# Patient Record
Sex: MALE | Race: WHITE | NOT HISPANIC OR LATINO | Employment: OTHER | URBAN - METROPOLITAN AREA
[De-identification: names, ages, dates, MRNs, and addresses within clinical notes are randomized per-mention and may not be internally consistent; named-entity substitution may affect disease eponyms.]

---

## 2024-03-22 ENCOUNTER — TELEPHONE (OUTPATIENT)
Age: 69
End: 2024-03-22

## 2024-03-27 ENCOUNTER — OFFICE VISIT (OUTPATIENT)
Dept: GASTROENTEROLOGY | Facility: CLINIC | Age: 69
End: 2024-03-27
Payer: COMMERCIAL

## 2024-03-27 VITALS
BODY MASS INDEX: 26.88 KG/M2 | DIASTOLIC BLOOD PRESSURE: 79 MMHG | HEART RATE: 63 BPM | OXYGEN SATURATION: 99 % | HEIGHT: 71 IN | WEIGHT: 192 LBS | TEMPERATURE: 96.7 F | SYSTOLIC BLOOD PRESSURE: 140 MMHG

## 2024-03-27 DIAGNOSIS — R21 PERIANAL RASH: ICD-10-CM

## 2024-03-27 DIAGNOSIS — R19.8 ANAL DISCHARGE: ICD-10-CM

## 2024-03-27 DIAGNOSIS — R19.4 CHANGE IN BOWEL HABITS: Primary | ICD-10-CM

## 2024-03-27 DIAGNOSIS — K62.5 BRBPR (BRIGHT RED BLOOD PER RECTUM): ICD-10-CM

## 2024-03-27 PROCEDURE — 99203 OFFICE O/P NEW LOW 30 MIN: CPT | Performed by: PHYSICIAN ASSISTANT

## 2024-03-27 RX ORDER — LEVOCETIRIZINE DIHYDROCHLORIDE 5 MG/1
5 TABLET, FILM COATED ORAL EVERY EVENING
COMMUNITY

## 2024-03-27 RX ORDER — HYDROCORTISONE 25 MG/G
CREAM TOPICAL 2 TIMES DAILY
Qty: 28 G | Refills: 5 | Status: SHIPPED | OUTPATIENT
Start: 2024-03-27

## 2024-03-27 RX ORDER — METOPROLOL SUCCINATE 50 MG/1
50 TABLET, EXTENDED RELEASE ORAL DAILY
COMMUNITY

## 2024-03-27 RX ORDER — LISINOPRIL 2.5 MG/1
2.5 TABLET ORAL DAILY
COMMUNITY

## 2024-03-27 RX ORDER — GLIPIZIDE 5 MG/1
5 TABLET, FILM COATED, EXTENDED RELEASE ORAL DAILY
COMMUNITY

## 2024-03-27 RX ORDER — ATORVASTATIN CALCIUM 10 MG/1
100 TABLET, FILM COATED ORAL DAILY
COMMUNITY

## 2024-03-27 RX ORDER — ASPIRIN 81 MG/1
81 TABLET, CHEWABLE ORAL DAILY
COMMUNITY

## 2024-03-27 RX ORDER — EZETIMIBE 10 MG/1
10 TABLET ORAL DAILY
COMMUNITY

## 2024-03-27 NOTE — PATIENT INSTRUCTIONS
Please start on a daily fiber powder something like Benefiber or Metamucil.  This typically tends to help bulk up and also keep stool nice and regular.  At times when patients have issues with drainage or incontinence, a fiber supplement tends to help with this complaint.  You do have some evidence of hemorrhoidal tissue externally as well as skin irritation in the surrounding areas.  I am giving you a steroid cream that we typically use to treat external hemorrhoids.  I would apply a small amount to this area for no more than 1 week at a time and see how your symptoms are.  The reasoning for limiting the use of the steroid is because it thins the skin.    Because of this change and new symptom, a colonoscopy is also recommended to completely evaluate for internal issues.  This will be completed at [facility].

## 2024-03-27 NOTE — PROGRESS NOTES
[facility] - Outpatient Consultation  Guy Burnham 68 y.o. male MRN: [id number]  Encounter: [id number]    ASSESSMENT AND PLAN:      1. Change in bowel habits  2. Anal discharge  3. BRBPR (bright red blood per rectum)  4. Perianal rash    Pt with several weeks - 1.5 months of change of bowel habit/anorectal discharge. Examination reveals hemorrhoids and a justyn-anal rash. Etiol of symptoms not clear at this time, may be secondary to hemorrhoids, anal fissure, IBD, proctitis, IBS, infectious pathology, medication SE, obstruction lesion etc. We did review today the rash may be secondary to irritative change, dermatitis, fungal infection, other etiol. We did review potential SE of Jardiance is genitourinary fungal infection.     At this time, recommend trial fiber supplement daily to help add bulk/form to stool.   Recommend colonoscopy to evaluate for intra-luminal sources of symptoms. Recommend bx for microscopic colitis in the setting of macroscopically normal colon.   Trial topical anusol for 7-10 days to external hemorrhoids, may help clear up rash. Reviewed limited use of medication as this will thin the skin.  However, ultimately, for evaluation of the rash I recommend Dermatology evaluation.     Risks associated with endoscopic evaluation discussed with patient today, including but not limited to bleeding, infection, perforation, and organ injury, all of which are low. Pt demonstrates understanding and is agreeable to the plan. Golytely bowel prep sent to pharmacy today.     - Colonoscopy; Future  - polyethylene glycol (GOLYTELY) 4000 mL solution; Take 4,000 mL by mouth once for 1 dose  Dispense: 4000 mL; Refill: 0  - hydrocortisone (ANUSOL-HC) 2.5 % rectal cream; Apply topically 2 (two) times a day  Dispense: 28 g; Refill: 5    We will follow up after colonoscopy evaluation.   ______________________________________________________________________    HPI: Patient is a 68 y.o. male  who presents today for a consultation regarding colonoscopy. Pmhx sig for DM2, CAD s/p CABG, HTN.     Pt is new to clinic. Here today with spouse. He notes that for the past 1-1.5 months, has been dealing with anorectal leakage, scant amt daily, notes wet in underwear, feels discomfort at anus. No obvious precipitants for onset of symptoms. Notes that typically moving his bowels once daily. More recently has been somewhat looser, though no diarrhea. Notes scant amt of BRBPR on the wipe only, no large volume hematochezia or melena. No straining or constipation. Concerned these symptoms may be secondary to Jardiance usage (has been on for several years) and d/c medication. Went to PCP, had fungal cream offered, though pt declined. Tried OTC prep H, bc felt like he was developing hemorrhoids also, didn't seem to make much of a difference. No sig abd pain or rectal pain related to defecation. No nocturnal BM. No unintentional weight loss over past 6 months. Pt denies any family hx of CRC. No hx of colon polyps.     Pertaining to UGI tract, pt notes infrequent heartburn depending on oral intake.   Denies nausea, emesis, dysphagia or odynphagia.   No early satiety.     NSAIDs: Rare  Tobacco: None  EtOH: Socially    05/2023: BUN 16, Cr 0.9, AST 23, ALT 25, ALP 66, albumin 4.2, t bili 1.2     Endoscopic history:   EGD: unknown   Colon: 2017    Review of Systems   Constitutional:  Negative for appetite change, chills, fever and unexpected weight change.   HENT:  Negative for mouth sores and trouble swallowing.    Respiratory:  Negative for shortness of breath.    Cardiovascular:  Negative for palpitations.   Gastrointestinal:  Positive for anal bleeding and diarrhea. Negative for abdominal pain, blood in stool, nausea and vomiting.   Endocrine: Negative.    Skin:  Positive for rash. Negative for color change.   Allergic/Immunologic: Negative.    Neurological:  Negative for seizures and syncope.   Hematological: Negative.   "  All other systems reviewed and are negative.  Otherwise Per HPI    Historical Information   Past Medical History:   Diagnosis Date    Allergic rhinitis     Diabetes mellitus (HCC)      Past Surgical History:   Procedure Laterality Date    PROSTATE SURGERY      PROSTATE SURGERY      had cancer     Social History   Social History     Substance and Sexual Activity   Alcohol Use Yes    Alcohol/week: 5.0 standard drinks of alcohol    Types: 5 Cans of beer per week    Comment: social  drinker     Social History     Substance and Sexual Activity   Drug Use Never     Social History     Tobacco Use   Smoking Status Never   Smokeless Tobacco Never     No family history on file.    Meds/Allergies       Current Outpatient Medications:     aspirin 81 mg chewable tablet    atorvastatin (LIPITOR) 10 mg tablet    ezetimibe (ZETIA) 10 mg tablet    glipiZIDE (GLUCOTROL XL) 5 mg 24 hr tablet    levocetirizine (XYZAL) 5 MG tablet    lisinopril (ZESTRIL) 2.5 mg tablet    metFORMIN (GLUCOPHAGE) 1000 MG tablet    metoprolol succinate (TOPROL-XL) 50 mg 24 hr tablet    sitaGLIPtin (JANUVIA) 100 mg tablet    Allergies   Allergen Reactions    Ozempic (0.25 Or 0.5 Mg-Dose) [Semaglutide(0.25 Or 0.5mg-Dos)] Vomiting     Colds  sweats    Trulicity [Dulaglutide] Vomiting     Colds sweats    Pollen Extract Nasal Congestion     Needs mask when cutting grass       Objective     Blood pressure 140/79, pulse 63, temperature (!) 96.7 °F (35.9 °C), temperature source Tympanic, height 5' 11\" (1.803 m), weight 87.1 kg (192 lb), SpO2 99%. Body mass index is 26.78 kg/m².    Physical Exam  Vitals and nursing note reviewed. Exam conducted with a chaperone present.   Constitutional:       General: He is not in acute distress.     Appearance: He is well-developed.   HENT:      Head: Normocephalic and atraumatic.   Eyes:      Conjunctiva/sclera: Conjunctivae normal.   Cardiovascular:      Rate and Rhythm: Normal rate and regular rhythm.      Heart sounds: No " murmur heard.  Pulmonary:      Effort: Pulmonary effort is normal. No respiratory distress.      Breath sounds: Normal breath sounds.   Abdominal:      General: Bowel sounds are normal. There is no distension.      Palpations: Abdomen is soft.      Tenderness: There is no abdominal tenderness. There is no guarding or rebound.   Genitourinary:     Comments: External hemorrhoids noted; there is perianal erythema, excoriation, thickening of tissue/kissing distribution; no obvious mass, no vesicular lesions, no ulcerations noted; there is no abnormality of the perineum noted   Skin:     General: Skin is warm and dry.      Coloration: Skin is not jaundiced.   Neurological:      General: No focal deficit present.      Mental Status: He is alert and oriented to person, place, and time.   Psychiatric:         Mood and Affect: Mood normal.         Behavior: Behavior normal.        Lab Results:   No visits with results within 1 Day(s) from this visit.   Latest known visit with results is:   No results found for any previous visit.     Radiology Results:   No results found.    Lina Goldsmith PA-C    **Please note:  Dictation voice to text software may have been used in the creation of this record.  Occasional wrong word or “sound alike” substitutions may have occurred due to the inherent limitations of voice recognition software.  Read the chart carefully and recognize, using context, where substitutions have occurred.**

## 2024-05-22 ENCOUNTER — HOSPITAL ENCOUNTER (OUTPATIENT)
Dept: PERIOP | Facility: HOSPITAL | Age: 69
Setting detail: OUTPATIENT SURGERY
Discharge: HOME | End: 2024-05-22
Admitting: STUDENT IN AN ORGANIZED HEALTH CARE EDUCATION/TRAINING PROGRAM
Payer: COMMERCIAL

## 2024-05-22 ENCOUNTER — ANESTHESIA EVENT (OUTPATIENT)
Dept: PERIOP | Facility: HOSPITAL | Age: 69
End: 2024-05-22

## 2024-05-22 ENCOUNTER — ANESTHESIA (OUTPATIENT)
Dept: PERIOP | Facility: HOSPITAL | Age: 69
End: 2024-05-22

## 2024-05-22 VITALS
WEIGHT: 180 LBS | TEMPERATURE: 97 F | SYSTOLIC BLOOD PRESSURE: 138 MMHG | HEART RATE: 68 BPM | RESPIRATION RATE: 18 BRPM | OXYGEN SATURATION: 99 % | DIASTOLIC BLOOD PRESSURE: 79 MMHG | BODY MASS INDEX: 25.2 KG/M2 | HEIGHT: 71 IN

## 2024-05-22 DIAGNOSIS — R19.4 CHANGE IN BOWEL HABITS: ICD-10-CM

## 2024-05-22 DIAGNOSIS — R19.8 ANAL DISCHARGE: ICD-10-CM

## 2024-05-22 LAB — GLUCOSE SERPL-MCNC: 131 MG/DL (ref 65–140)

## 2024-05-22 PROCEDURE — 88305 TISSUE EXAM BY PATHOLOGIST: CPT | Performed by: PATHOLOGY

## 2024-05-22 PROCEDURE — 82948 REAGENT STRIP/BLOOD GLUCOSE: CPT

## 2024-05-22 PROCEDURE — 45380 COLONOSCOPY AND BIOPSY: CPT | Performed by: STUDENT IN AN ORGANIZED HEALTH CARE EDUCATION/TRAINING PROGRAM

## 2024-05-22 RX ORDER — ONDANSETRON 2 MG/ML
4 INJECTION INTRAMUSCULAR; INTRAVENOUS ONCE AS NEEDED
Status: DISCONTINUED | OUTPATIENT
Start: 2024-05-22 | End: 2024-05-26 | Stop reason: HOSPADM

## 2024-05-22 RX ORDER — PROPOFOL 10 MG/ML
INJECTION, EMULSION INTRAVENOUS AS NEEDED
Status: DISCONTINUED | OUTPATIENT
Start: 2024-05-22 | End: 2024-05-22

## 2024-05-22 RX ORDER — LIDOCAINE HYDROCHLORIDE 10 MG/ML
INJECTION, SOLUTION EPIDURAL; INFILTRATION; INTRACAUDAL; PERINEURAL AS NEEDED
Status: DISCONTINUED | OUTPATIENT
Start: 2024-05-22 | End: 2024-05-22

## 2024-05-22 RX ORDER — SODIUM CHLORIDE, SODIUM LACTATE, POTASSIUM CHLORIDE, CALCIUM CHLORIDE 600; 310; 30; 20 MG/100ML; MG/100ML; MG/100ML; MG/100ML
INJECTION, SOLUTION INTRAVENOUS CONTINUOUS PRN
Status: DISCONTINUED | OUTPATIENT
Start: 2024-05-22 | End: 2024-05-22

## 2024-05-22 RX ADMIN — LIDOCAINE HYDROCHLORIDE 50 MG: 10 INJECTION, SOLUTION EPIDURAL; INFILTRATION; INTRACAUDAL; PERINEURAL at 08:59

## 2024-05-22 RX ADMIN — SODIUM CHLORIDE, SODIUM LACTATE, POTASSIUM CHLORIDE, AND CALCIUM CHLORIDE: .6; .31; .03; .02 INJECTION, SOLUTION INTRAVENOUS at 08:45

## 2024-05-22 RX ADMIN — PROPOFOL 70 MG: 10 INJECTION, EMULSION INTRAVENOUS at 08:59

## 2024-05-22 RX ADMIN — PROPOFOL 150 MCG/KG/MIN: 10 INJECTION, EMULSION INTRAVENOUS at 09:00

## 2024-05-22 RX ADMIN — PROPOFOL 30 MG: 10 INJECTION, EMULSION INTRAVENOUS at 09:15

## 2024-05-22 RX ADMIN — PROPOFOL 30 MG: 10 INJECTION, EMULSION INTRAVENOUS at 09:11

## 2024-05-22 NOTE — H&P
[facility]  History & Physical     PATIENT INFO     Name: Guy Burnham  YOB: 1955   Age: 68 y.o.   Sex: male   MRN: [id number]     HISTORY OF PRESENT ILLNESS     Guy Burnham is a 68 y.o. year old male who presents for colonoscopy for change in bowel habits, bright red blood per rectum.  Last colonoscopy in 2017.  No antithrombotics or anticoagulants.     REVIEW OF SYSTEMS     Per the HPI, and otherwise unremarkable.    Historical Information   Past Medical History:   Diagnosis Date    Allergic rhinitis     Diabetes mellitus (HCC)      Past Surgical History:   Procedure Laterality Date    PROSTATE SURGERY      PROSTATE SURGERY      had cancer     Social History   Social History     Substance and Sexual Activity   Alcohol Use Yes    Alcohol/week: 5.0 standard drinks of alcohol    Types: 5 Cans of beer per week    Comment: social  drinker     Social History     Substance and Sexual Activity   Drug Use Never     Social History     Tobacco Use   Smoking Status Never   Smokeless Tobacco Never     History reviewed. No pertinent family history.     MEDICATIONS & ALLERGIES     Current Outpatient Medications   Medication Instructions    aspirin 81 mg, Oral, Daily    atorvastatin (LIPITOR) 100 mg, Oral, Daily    ezetimibe (ZETIA) 10 mg, Oral, Daily    glipiZIDE (GLUCOTROL XL) 5 mg, Oral, Daily    hydrocortisone (ANUSOL-HC) 2.5 % rectal cream Topical, 2 times daily    Jardiance 10 mg, Oral, Daily    levocetirizine (XYZAL) 5 mg, Oral, Every evening    lisinopril (ZESTRIL) 2.5 mg, Oral, Daily    metFORMIN (GLUCOPHAGE) 1,000 mg, Oral, 2 times daily with meals    metoprolol succinate (TOPROL-XL) 50 mg, Oral, Daily    sitaGLIPtin (JANUVIA) 100 mg tablet Oral, Daily     Allergies   Allergen Reactions    Ozempic (0.25 Or 0.5 Mg-Dose) [Semaglutide(0.25 Or 0.5mg-Dos)] Vomiting     Colds  sweats    Trulicity [Dulaglutide] Vomiting     Colds sweats    Pollen Extract Nasal Congestion      "Needs mask when cutting grass        PHYSICAL EXAM      Objective   Blood pressure 135/76, pulse 62, temperature (!) 97.2 °F (36.2 °C), temperature source Tympanic, resp. rate 18, height 5' 11\" (1.803 m), weight 81.6 kg (180 lb), SpO2 98%. Body mass index is 25.1 kg/m².    General Appearance:   Alert, cooperative, no distress   Lungs:   Equal chest rise, respirations unlabored    Heart:   Regular rate and rhythm   Abdomen:   Soft, non-tender, non-distended; normal bowel sounds; no masses, no organomegaly    Extremities:   No edema       ASSESSMENT & PLAN     This is a 68 y.o. year old male here for colonoscopy, and he is stable and optimized for his procedure.      Jak Lozano D.O.  [facility]  Division of Gastroenterology & Hepatology  Available on [email or web address]  [email or web address]    ** Please Note: This note is constructed using a voice recognition dictation system. **  "

## 2024-05-22 NOTE — ANESTHESIA POSTPROCEDURE EVALUATION
Post-Op Assessment Note    CV Status:  Stable    Pain management: adequate       Mental Status:  Alert and sleepy   Hydration Status:  Euvolemic   PONV Controlled:  Controlled   Airway Patency:  Patent     Post Op Vitals Reviewed: Yes    No anethesia notable event occurred.    Staff: CRNA               BP   127/73   Temp   97.2   Pulse  67   Resp   18   SpO2   98%

## 2024-05-22 NOTE — ANESTHESIA PREPROCEDURE EVALUATION
Procedure:  COLONOSCOPY    Relevant Problems   No relevant active problems        Physical Exam    Airway    Mallampati score: II  TM Distance: >3 FB  Neck ROM: full     Dental       Cardiovascular      Pulmonary      Other Findings        Anesthesia Plan  ASA Score- 2     Anesthesia Type- IV sedation with anesthesia with ASA Monitors.         Additional Monitors:     Airway Plan:            Plan Factors-    Chart reviewed.                      Induction- intravenous.    Postoperative Plan-         Informed Consent- Anesthetic plan and risks discussed with patient.  I personally reviewed this patient with the CRNA. Discussed and agreed on the Anesthesia Plan with the CRNA..

## 2024-05-24 PROCEDURE — 88305 TISSUE EXAM BY PATHOLOGIST: CPT | Performed by: PATHOLOGY

## 2024-06-04 ENCOUNTER — TELEPHONE (OUTPATIENT)
Dept: HEMATOLOGY ONCOLOGY | Facility: CLINIC | Age: 69
End: 2024-06-04

## 2024-06-04 NOTE — TELEPHONE ENCOUNTER
----- Message from Jak Lozano DO sent at 6/4/2024  9:21 AM EDT -----  Hi,    Please contact patient and inform him that the colon polyp that we removed was a benign but precancerous type polyp called a sessile serrated polyp.  The random biopsies did not show any signs of inflammation.  This is good news.    I recommend next colonoscopy in 7 years.    Thanks.    Jak Lozano D.O.  [facility]  Division of Gastroenterology & Hepatology  Available on [email or web address]  [email or web address]

## 2025-04-16 ENCOUNTER — TELEPHONE (OUTPATIENT)
Age: 70
End: 2025-04-16

## 2025-04-16 NOTE — TELEPHONE ENCOUNTER
New Patient      Insurance   Current Insurance? CBC   Insurance E-verified? Yes    History   Reason for appointment/active symptoms? Elevated PSA of 2.1 after prostatectomy about 10 years ago due to prostate cancer       Has the patient had any previous Urologist(s)? Yes, Rianna     Was the patient seen in the ED? No     Labs/Imaging(Including Out Of Network)? Yes     Records Requested? No, records on file   Records Visible in EPIC? Yes      Personal history of cancer? Yes, prostate and skin both resolved     Appointment   Office location preference:  [facility]  ?   Appointment Details   Date: 5/15/25  Time: 830 am  Location: [facility]  Provider: Dr. Izquierdo  Does the appointment need further review? No

## 2025-05-14 PROBLEM — C61 PROSTATE CANCER (HCC): Status: ACTIVE | Noted: 2025-05-14

## 2025-05-15 ENCOUNTER — OFFICE VISIT (OUTPATIENT)
Dept: UROLOGY | Facility: CLINIC | Age: 70
End: 2025-05-15
Payer: COMMERCIAL

## 2025-05-15 ENCOUNTER — TELEPHONE (OUTPATIENT)
Dept: UROLOGY | Facility: CLINIC | Age: 70
End: 2025-05-15

## 2025-05-15 VITALS
OXYGEN SATURATION: 94 % | SYSTOLIC BLOOD PRESSURE: 140 MMHG | HEART RATE: 68 BPM | HEIGHT: 72 IN | DIASTOLIC BLOOD PRESSURE: 82 MMHG | WEIGHT: 193 LBS | TEMPERATURE: 97.6 F | BODY MASS INDEX: 26.14 KG/M2

## 2025-05-15 DIAGNOSIS — C61 PROSTATE CANCER (HCC): Primary | ICD-10-CM

## 2025-05-15 DIAGNOSIS — E11.40 TYPE 2 DIABETES MELLITUS WITH DIABETIC NEUROPATHY, WITH LONG-TERM CURRENT USE OF INSULIN (HCC): ICD-10-CM

## 2025-05-15 DIAGNOSIS — Z79.4 TYPE 2 DIABETES MELLITUS WITH DIABETIC NEUROPATHY, WITH LONG-TERM CURRENT USE OF INSULIN (HCC): ICD-10-CM

## 2025-05-15 LAB
SL AMB  POCT GLUCOSE, UA: NORMAL
SL AMB LEUKOCYTE ESTERASE,UA: NORMAL
SL AMB POCT BILIRUBIN,UA: NORMAL
SL AMB POCT BLOOD,UA: NORMAL
SL AMB POCT CLARITY,UA: CLEAR
SL AMB POCT COLOR,UA: YELLOW
SL AMB POCT KETONES,UA: NORMAL
SL AMB POCT NITRITE,UA: NORMAL
SL AMB POCT PH,UA: 6
SL AMB POCT SPECIFIC GRAVITY,UA: 1.02
SL AMB POCT URINE PROTEIN: NORMAL
SL AMB POCT UROBILINOGEN: 0.2

## 2025-05-15 PROCEDURE — 99203 OFFICE O/P NEW LOW 30 MIN: CPT | Performed by: UROLOGY

## 2025-05-15 PROCEDURE — 81003 URINALYSIS AUTO W/O SCOPE: CPT | Performed by: UROLOGY

## 2025-05-15 RX ORDER — ACYCLOVIR 400 MG/1
TABLET ORAL
COMMUNITY
Start: 2025-04-25

## 2025-05-15 RX ORDER — GLIPIZIDE 5 MG/1
TABLET ORAL
COMMUNITY
Start: 2025-04-28

## 2025-05-15 RX ORDER — ATORVASTATIN CALCIUM 80 MG/1
80 TABLET, FILM COATED ORAL EVERY MORNING
COMMUNITY
Start: 2025-04-19

## 2025-05-15 NOTE — PROGRESS NOTES
UROLOGY PROGRESS NOTE         NAME: Guy Burnham  AGE: 69 y.o. SEX: male  : 1955   MRN: [id number]    DATE: 5/15/2025  TIME: 9:57 AM    Assessment and Plan      Impression:   1. Prostate cancer (HCC)  -     POCT urine dip auto non-scope  -     NM PET CT skull base to mid thigh; Future; Expected date: 05/15/2025  2. Type 2 diabetes mellitus with diabetic neuropathy, with long-term current use of insulin (HCC)       Plan: Patient has prostate cancer recurrence likely with PSA of 2.1 recently.  Looking back over old PSAs his PSA doubling time is slightly greater than 1 year.  I recommended a PMSA PET scan.  I explained to him this is most likely a local recurrence however it could be metastatic disease.  Treatment will depend on the findings.  He had his robotic prostatectomy with Dr. Gerry Quiles at [facility] about 10 years ago.  I do not have any pathology available.  He was told his prostate cancer was completely removed and treated at that time.      Chief Complaint     Chief Complaint   Patient presents with   • New Patient Visit     PSA 25 2.1  Prostate removed 10 years ago at [facility]     History of Present Illness     HPI: Guy Burnham is a 69 y.o. year old male who presents with history of prostate cancer status post prostatectomy 10 years ago with recent PSA of 2.1 indicating recurrence.  Here for initial visit.  PSAs include 0.36 , 0.73 , 0.82 , and most recently 2.1 .  Urinary control is good.  Apparently patient had nerve sparing procedure.  Patient's dad also had prostate cancer with prostate removed at age 50.  Patient's father is now 89.                The following portions of the patient's history were reviewed and updated as appropriate: allergies, current medications, past family history, past medical history, past social history, past surgical history and problem list.  Past Medical History:   Diagnosis Date   • Allergic rhinitis    • Diabetes  mellitus (HCC)      Past Surgical History:   Procedure Laterality Date   • PROSTATE SURGERY     • PROSTATE SURGERY      had cancer     shoulder  Review of Systems     Const: Denies chills, fever and weight loss.  CV: Denies chest pain.  Resp: Denies SOB.  GI: Denies abdominal pain, nausea and vomiting.  : Denies symptoms other than stated above.  Musculo: Denies back pain.    Objective   /82   Pulse 68   Temp 97.6 °F (36.4 °C)   Ht 6' (1.829 m)   Wt 87.5 kg (193 lb)   SpO2 94%   BMI 26.18 kg/m²     Physical Exam  Const: Appears healthy and well developed. No signs of acute distress present.  Resp: Respirations are regular and unlabored.   CV: Rate is regular. Rhythm is regular.  Abdomen: Abdomen is soft, nontender, and nondistended. Kidneys are not palpable.  : External genitalia normal.  Prostate surgically absent and no nodule or digital evidence of local recurrence.  Psych: Patient's attitude is cooperative. Mood is normal. Affect is normal.    Procedure   Procedures     Current Medications   Current Medications[1]        Hugo Izquierdo MD               [1]    Current Outpatient Medications:   •  aspirin 81 mg chewable tablet, Chew 81 mg in the morning., Disp: , Rfl:   •  atorvastatin (LIPITOR) 80 mg tablet, Take 80 mg by mouth every morning, Disp: , Rfl:   •  Continuous Glucose Sensor (Dexcom G7 Sensor), USE TO TEST BLOOD SUGARS 4+ TIMES DAILY, Disp: , Rfl:   •  ezetimibe (ZETIA) 10 mg tablet, Take 10 mg by mouth in the morning., Disp: , Rfl:   •  glipiZIDE (GLUCOTROL) 5 mg tablet, PLEASE SEE ATTACHED FOR DETAILED DIRECTIONS, Disp: , Rfl:   •  levocetirizine (XYZAL) 5 MG tablet, Take 5 mg by mouth every evening, Disp: , Rfl:   •  lisinopril (ZESTRIL) 2.5 mg tablet, Take 2.5 mg by mouth in the morning., Disp: , Rfl:   •  metFORMIN (GLUCOPHAGE) 1000 MG tablet, Take 1,000 mg by mouth in the morning and 1,000 mg in the evening. Take with meals., Disp: , Rfl:   •  metoprolol succinate (TOPROL-XL) 50  mg 24 hr tablet, Take 50 mg by mouth in the morning., Disp: , Rfl:   •  sitaGLIPtin (JANUVIA) 100 mg tablet, Take by mouth in the morning., Disp: , Rfl:   •  hydrocortisone (ANUSOL-HC) 2.5 % rectal cream, Apply topically 2 (two) times a day (Patient not taking: Reported on 5/15/2025), Disp: 28 g, Rfl: 5  •  Jardiance 10 MG TABS tablet, Take 10 mg by mouth daily (Patient not taking: Reported on 5/15/2025), Disp: , Rfl:

## 2025-05-23 ENCOUNTER — HOSPITAL ENCOUNTER (OUTPATIENT)
Dept: NUCLEAR MEDICINE | Facility: HOSPITAL | Age: 70
End: 2025-05-23
Attending: UROLOGY
Payer: COMMERCIAL

## 2025-05-23 DIAGNOSIS — C61 PROSTATE CANCER (HCC): ICD-10-CM

## 2025-05-23 PROCEDURE — 78815 PET IMAGE W/CT SKULL-THIGH: CPT

## 2025-05-23 PROCEDURE — A9595 HB PIFLUFOLASTAT F-18, DIAGNOSTIC, 1 MILLICURIE: HCPCS

## 2025-05-28 ENCOUNTER — TELEPHONE (OUTPATIENT)
Age: 70
End: 2025-05-28

## 2025-06-10 NOTE — PROGRESS NOTES
UROLOGY PROGRESS NOTE         NAME: Guy Bunrham  AGE: 69 y.o. SEX: male  : 1955   MRN: [id number]    DATE: 2025  TIME: 8:50 AM    Assessment and Plan      Impression:   1. Prostate cancer (HCC)  -     MRI pelvis w wo contrast; Future; Expected date: 2025  -     Ambulatory Referral to Radiation Oncology; Future       Plan: Patient appears to have a local recurrence likely near the right urethrovesical junction.  As noted he had a prostatectomy 10 years ago.  Recent PSA 2.1.  This is the first PSA has had in many years.  P MSA PET scan positive in that location.    Discussed with patient and wife.  Appears to be candidate for radiation plus a course of androgen deprivation.  Will leave duration of androgen deprivation course up to radiation oncology.  Also offered patient second opinion from medical oncology or urology standpoint.  In addition I recommended a pelvic MRI which was suggested by the radiologist reading a PMS a PET scan.  I also recommended a cystoscopy to rule out any other pathology including possible diverticulum in that location.  See the PET scan report.  Patient remains asymptomatic.  Follow-up for cystoscopy after pelvic MRI.      Chief Complaint     Chief Complaint   Patient presents with   • Follow-up     Pt reports today to review PET scan. PSA 2025 2.1     History of Present Illness     HPI: Guy Burnham is a 69 y.o. year old male who presents with history of prostate cancer treated with prostatectomy by Dr. Gerry Quiles 10 years ago with PSA recurrence 2.1 recently.  PMS a scan performed patient here to review.  This shows activity potentially related to a bladder diverticulum versus recurrence.  Prostate/pelvic MRI recommended.              The following portions of the patient's history were reviewed and updated as appropriate: allergies, current medications, past family history, past medical history, past social history, past surgical history and problem  list.  Past Medical History[1]  Past Surgical History[2]  shoulder  Review of Systems     Const: Denies chills, fever and weight loss.  CV: Denies chest pain.  Resp: Denies SOB.  GI: Denies abdominal pain, nausea and vomiting.  : Denies symptoms other than stated above.  Musculo: Denies back pain.    Objective   /78   Pulse 70   Temp 98 °F (36.7 °C)   Ht 6' (1.829 m)   Wt 88.4 kg (194 lb 12.8 oz)   SpO2 97%   BMI 26.42 kg/m²     Physical Exam  Const: Appears healthy and well developed. No signs of acute distress present.  Resp: Respirations are regular and unlabored.   CV: Rate is regular. Rhythm is regular.  Abdomen: Abdomen is soft, nontender, and nondistended. Kidneys are not palpable.  : Not performed  Psych: Patient's attitude is cooperative. Mood is normal. Affect is normal.    Procedure   Procedures     Current Medications   Current Medications[3]        Hugo Izquierdo MD               [1]  Past Medical History:  Diagnosis Date   • Allergic rhinitis    • Diabetes mellitus (HCC)    [2]  Past Surgical History:  Procedure Laterality Date   • PROSTATE SURGERY     • PROSTATE SURGERY      had cancer   [3]    Current Outpatient Medications:   •  aspirin 81 mg chewable tablet, Chew 81 mg in the morning., Disp: , Rfl:   •  atorvastatin (LIPITOR) 80 mg tablet, Take 80 mg by mouth every morning, Disp: , Rfl:   •  Continuous Glucose Sensor (Dexcom G7 Sensor), USE TO TEST BLOOD SUGARS 4+ TIMES DAILY, Disp: , Rfl:   •  ezetimibe (ZETIA) 10 mg tablet, Take 10 mg by mouth in the morning., Disp: , Rfl:   •  glipiZIDE (GLUCOTROL) 5 mg tablet, PLEASE SEE ATTACHED FOR DETAILED DIRECTIONS, Disp: , Rfl:   •  levocetirizine (XYZAL) 5 MG tablet, Take 5 mg by mouth every evening, Disp: , Rfl:   •  lisinopril (ZESTRIL) 2.5 mg tablet, Take 2.5 mg by mouth in the morning., Disp: , Rfl:   •  metFORMIN (GLUCOPHAGE) 1000 MG tablet, Take 1,000 mg by mouth in the morning and 1,000 mg in the evening. Take with meals., Disp: ,  Rfl:   •  metoprolol succinate (TOPROL-XL) 50 mg 24 hr tablet, Take 50 mg by mouth in the morning., Disp: , Rfl:   •  sitaGLIPtin (JANUVIA) 100 mg tablet, Take by mouth in the morning., Disp: , Rfl:   •  hydrocortisone (ANUSOL-HC) 2.5 % rectal cream, Apply topically 2 (two) times a day (Patient not taking: Reported on 6/11/2025), Disp: 28 g, Rfl: 5  •  Jardiance 10 MG TABS tablet, Take 10 mg by mouth daily (Patient not taking: Reported on 6/11/2025), Disp: , Rfl:

## 2025-06-11 ENCOUNTER — OFFICE VISIT (OUTPATIENT)
Dept: UROLOGY | Facility: CLINIC | Age: 70
End: 2025-06-11
Payer: COMMERCIAL

## 2025-06-11 VITALS
BODY MASS INDEX: 26.38 KG/M2 | SYSTOLIC BLOOD PRESSURE: 134 MMHG | HEART RATE: 70 BPM | HEIGHT: 72 IN | TEMPERATURE: 98 F | DIASTOLIC BLOOD PRESSURE: 78 MMHG | OXYGEN SATURATION: 97 % | WEIGHT: 194.8 LBS

## 2025-06-11 DIAGNOSIS — C61 PROSTATE CANCER (HCC): Primary | ICD-10-CM

## 2025-06-11 PROCEDURE — 99214 OFFICE O/P EST MOD 30 MIN: CPT | Performed by: UROLOGY

## 2025-06-28 ENCOUNTER — HOSPITAL ENCOUNTER (OUTPATIENT)
Dept: RADIOLOGY | Facility: HOSPITAL | Age: 70
Discharge: HOME | End: 2025-06-28
Payer: COMMERCIAL

## 2025-06-28 ENCOUNTER — HOSPITAL ENCOUNTER (OUTPATIENT)
Dept: MRI IMAGING | Facility: HOSPITAL | Age: 70
Discharge: HOME | End: 2025-06-28
Attending: UROLOGY
Payer: COMMERCIAL

## 2025-06-28 DIAGNOSIS — C61 PROSTATE CANCER (HCC): ICD-10-CM

## 2025-06-28 DIAGNOSIS — S05.41XA FOREIGN BODY OF BOTH ORBITS: ICD-10-CM

## 2025-06-28 DIAGNOSIS — S05.42XA FOREIGN BODY OF BOTH ORBITS: ICD-10-CM

## 2025-06-28 PROCEDURE — 72197 MRI PELVIS W/O & W/DYE: CPT

## 2025-06-28 PROCEDURE — A9585 GADOBUTROL INJECTION: HCPCS | Performed by: UROLOGY

## 2025-06-28 RX ORDER — GADOBUTROL 604.72 MG/ML
8 INJECTION INTRAVENOUS
Status: COMPLETED | OUTPATIENT
Start: 2025-06-28 | End: 2025-06-28

## 2025-06-28 RX ADMIN — GADOBUTROL 8 ML: 604.72 INJECTION INTRAVENOUS at 15:42

## 2025-07-08 ENCOUNTER — PROCEDURE VISIT (OUTPATIENT)
Dept: UROLOGY | Facility: CLINIC | Age: 70
End: 2025-07-08
Payer: COMMERCIAL

## 2025-07-08 VITALS
OXYGEN SATURATION: 95 % | TEMPERATURE: 97.2 F | SYSTOLIC BLOOD PRESSURE: 138 MMHG | WEIGHT: 195.6 LBS | HEART RATE: 74 BPM | BODY MASS INDEX: 26.49 KG/M2 | DIASTOLIC BLOOD PRESSURE: 82 MMHG | HEIGHT: 72 IN

## 2025-07-08 DIAGNOSIS — C61 PROSTATE CANCER (HCC): Primary | ICD-10-CM

## 2025-07-08 LAB
SL AMB  POCT GLUCOSE, UA: ABNORMAL
SL AMB LEUKOCYTE ESTERASE,UA: ABNORMAL
SL AMB POCT BILIRUBIN,UA: ABNORMAL
SL AMB POCT BLOOD,UA: ABNORMAL
SL AMB POCT CLARITY,UA: CLEAR
SL AMB POCT COLOR,UA: YELLOW
SL AMB POCT KETONES,UA: ABNORMAL
SL AMB POCT NITRITE,UA: ABNORMAL
SL AMB POCT PH,UA: 5.5
SL AMB POCT SPECIFIC GRAVITY,UA: 1.02
SL AMB POCT URINE PROTEIN: ABNORMAL
SL AMB POCT UROBILINOGEN: 1

## 2025-07-08 PROCEDURE — 81003 URINALYSIS AUTO W/O SCOPE: CPT | Performed by: UROLOGY

## 2025-07-08 PROCEDURE — 52000 CYSTOURETHROSCOPY: CPT | Performed by: UROLOGY

## 2025-07-08 RX ORDER — BICALUTAMIDE 50 MG/1
50 TABLET, FILM COATED ORAL DAILY
Qty: 30 TABLET | Refills: 1 | Status: SHIPPED | OUTPATIENT
Start: 2025-07-08

## 2025-07-08 RX ORDER — CEPHALEXIN 500 MG/1
500 CAPSULE ORAL ONCE
Status: COMPLETED | OUTPATIENT
Start: 2025-07-08 | End: 2025-07-08

## 2025-07-08 RX ADMIN — CEPHALEXIN 500 MG: 500 CAPSULE ORAL at 11:57

## 2025-07-08 NOTE — PROGRESS NOTES
Cystoscopy     Date/Time  7/8/2025 10:00 AM     Performed by  Barry Izquierdo MD   Authorized by  Barry Izquierdo MD       Universal Protocol:  Consent: Verbal consent obtained. Written consent obtained  Risks and benefits: risks, benefits and alternatives were discussed  Consent given by: patient  Patient understanding: patient states understanding of the procedure being performed  Patient consent: the patient's understanding of the procedure matches consent given  Procedure consent: procedure consent matches procedure scheduled  Relevant documents: relevant documents present and verified  Patient identity confirmed: verbally with patient      Procedure Details:  Procedure type: cystoscopy    Patient tolerance: Patient tolerated the procedure well with no immediate complications    Additional Procedure Details: Patient with localized recurrence of prostate cancer following radiation in the past.  1.9 cm area noted on MRI and PET scan at anastomosis.  Radiologist recommended cystoscopy due to concern over potential bladder diverticulum or other bladder pathology in this location.  Patient here for cystoscopy  Patient was placed in supine position.  Penis was prepped and draped in usual sterile fashion using Betadine.  Consent have been obtained.  Lidocaine jelly was instilled per urethra.  Flexible cystoscopy was then performed.  Urethra was normal.  The prostate is surgically absent.  The anastomosis looks good.  No mass no lesion.  The bladder is then carefully inspected there are no bladder tumors stones or diverticula.  The right and left ureteral orifice are normal.  There is no trabeculation.  The scope was retroflexed and the bladder outlet is normal.    Patient tolerated procedure well scope was removed.  I reviewed findings with he and his significant other.  He is seen radiation oncology.  They also recommend a 6-month course of Lupron in addition to radiation treatment.  I discussed Lupron in detail with  him today he understands risks and benefits.  He wants to return for his Lupron as a nurse visit sometime soon.  I am starting him on Casodex 50 mg daily today.  He will be returning to see radiation oncology soon.  MRI findings were discussed as well.  In addition to his visit for Lupron 6-month dose sometime soon, he should follow-up in 6 months with a PSA prior.

## 2025-07-21 ENCOUNTER — PROCEDURE VISIT (OUTPATIENT)
Dept: UROLOGY | Facility: CLINIC | Age: 70
End: 2025-07-21
Payer: COMMERCIAL

## 2025-07-21 DIAGNOSIS — C61 PROSTATE CANCER (HCC): Primary | ICD-10-CM

## 2025-07-21 PROCEDURE — 96402 CHEMO HORMON ANTINEOPL SQ/IM: CPT

## 2025-07-22 NOTE — PROGRESS NOTES
7/21/2025  Guy Burnham is a 70 y.o. male  [id number]    Diagnosis:  Chief Complaint    lupron injection         Patient presents for IM Lupron managed by Dr. Izquierdo    Plan:  Give IM lupron today 6 month dose      Medication administration:    Luron 45mg (6 month dose)    Administrations This Visit       leuprolide (LUPRON DEPOT 6 MONTH KIT) IM injection kit 45 mg       Admin Date  07/21/2025 Action  Given Dose  45 mg Route  Intramuscular Documented By  Alysa Aguilera LPN                Procedures      There were no vitals filed for this visit.      Alysa Aguilera LPN